# Patient Record
Sex: FEMALE | Race: WHITE
[De-identification: names, ages, dates, MRNs, and addresses within clinical notes are randomized per-mention and may not be internally consistent; named-entity substitution may affect disease eponyms.]

---

## 2021-01-03 ENCOUNTER — HOSPITAL ENCOUNTER (OUTPATIENT)
Dept: HOSPITAL 33 - OB | Age: 21
Setting detail: OBSERVATION
Discharge: HOME | End: 2021-01-03
Attending: OBSTETRICS & GYNECOLOGY | Admitting: OBSTETRICS & GYNECOLOGY
Payer: COMMERCIAL

## 2021-01-03 VITALS — SYSTOLIC BLOOD PRESSURE: 97 MMHG | HEART RATE: 75 BPM | DIASTOLIC BLOOD PRESSURE: 55 MMHG

## 2021-01-03 DIAGNOSIS — O23.43: Primary | ICD-10-CM

## 2021-01-03 DIAGNOSIS — E66.01: ICD-10-CM

## 2021-01-03 DIAGNOSIS — Z3A.36: ICD-10-CM

## 2021-01-03 DIAGNOSIS — O99.213: ICD-10-CM

## 2021-01-03 LAB
AMPHETAMINES UR QL: NEGATIVE
BARBITURATES UR QL: NEGATIVE
BENZODIAZ UR QL SCN: NEGATIVE
COCAINE UR QL SCN: NEGATIVE
GLUCOSE UR-MCNC: NEGATIVE MG/DL
METHADONE UR QL: NEGATIVE
OPIATES UR QL: NEGATIVE
PCP UR QL CFM>20 NG/ML: NEGATIVE
PROT UR STRIP-MCNC: NEGATIVE MG/DL
RBC #/AREA URNS HPF: (no result) /HPF (ref 0–2)
THC UR QL SCN: NEGATIVE
WBC #/AREA URNS HPF: (no result) /HPF (ref 0–5)

## 2021-01-03 PROCEDURE — 81001 URINALYSIS AUTO W/SCOPE: CPT

## 2021-01-03 PROCEDURE — 80307 DRUG TEST PRSMV CHEM ANLYZR: CPT

## 2021-01-11 ENCOUNTER — HOSPITAL ENCOUNTER (OUTPATIENT)
Dept: HOSPITAL 33 - OB | Age: 21
Setting detail: OBSERVATION
LOS: 2 days | Discharge: HOME | End: 2021-01-13
Attending: FAMILY MEDICINE | Admitting: FAMILY MEDICINE
Payer: COMMERCIAL

## 2021-01-11 DIAGNOSIS — F19.11: ICD-10-CM

## 2021-01-11 DIAGNOSIS — D64.9: ICD-10-CM

## 2021-01-11 DIAGNOSIS — Z3A.37: ICD-10-CM

## 2021-01-11 DIAGNOSIS — Z34.83: Primary | ICD-10-CM

## 2021-01-11 LAB
ALBUMIN SERPL-MCNC: 3.3 G/DL (ref 3.5–5)
ALP SERPL-CCNC: 99 U/L (ref 38–126)
ALT SERPL-CCNC: 8 U/L (ref 0–35)
AMPHETAMINES UR QL: NEGATIVE
ANION GAP SERPL CALC-SCNC: 11.4 MEQ/L (ref 5–15)
AST SERPL QL: 12 U/L (ref 14–36)
BARBITURATES UR QL: NEGATIVE
BASOPHILS # BLD AUTO: 0.03 10*3/UL (ref 0–0.4)
BASOPHILS NFR BLD AUTO: 0.2 % (ref 0–0.4)
BENZODIAZ UR QL SCN: NEGATIVE
BILIRUB BLD-MCNC: 0.3 MG/DL (ref 0.2–1.3)
BUN SERPL-MCNC: 8 MG/DL (ref 7–17)
CALCIUM SPEC-MCNC: 9 MG/DL (ref 8.4–10.2)
CHLORIDE SERPL-SCNC: 107 MMOL/L (ref 98–107)
CO2 SERPL-SCNC: 20 MMOL/L (ref 22–30)
COCAINE UR QL SCN: NEGATIVE
CREAT SERPL-MCNC: 0.54 MG/DL (ref 0.52–1.04)
EOSINOPHIL # BLD AUTO: 0.03 10*3/UL (ref 0–0.5)
GFR SERPLBLD BASED ON 1.73 SQ M-ARVRAT: > 60 ML/MIN
GLUCOSE SERPL-MCNC: 99 MG/DL (ref 74–106)
GLUCOSE UR-MCNC: NEGATIVE MG/DL
HCT VFR BLD AUTO: 27.6 % (ref 35–47)
HGB BLD-MCNC: 8.3 GM/DL (ref 12–16)
LYMPHOCYTES # SPEC AUTO: 2.04 10*3/UL (ref 1–4.6)
MCH RBC QN AUTO: 23.5 PG (ref 26–32)
MCHC RBC AUTO-ENTMCNC: 30.1 G/DL (ref 32–36)
METHADONE UR QL: NEGATIVE
MONOCYTES # BLD AUTO: 0.42 10*3/UL (ref 0–1.3)
OPIATES UR QL: NEGATIVE
PCP UR QL CFM>20 NG/ML: NEGATIVE
PLATELET # BLD AUTO: 266 K/MM3 (ref 150–450)
POTASSIUM SERPLBLD-SCNC: 3.5 MMOL/L (ref 3.5–5.1)
PROT SERPL-MCNC: 7 G/DL (ref 6.3–8.2)
PROT UR STRIP-MCNC: NEGATIVE MG/DL
RBC # BLD AUTO: 3.53 M/MM3 (ref 4.1–5.4)
RBC #/AREA URNS HPF: (no result) /HPF (ref 0–2)
SODIUM SERPL-SCNC: 135 MMOL/L (ref 137–145)
THC UR QL SCN: NEGATIVE
WBC # BLD AUTO: 12.2 K/MM3 (ref 4–10.5)
WBC #/AREA URNS HPF: (no result) /HPF (ref 0–5)

## 2021-01-11 PROCEDURE — 36415 COLL VENOUS BLD VENIPUNCTURE: CPT

## 2021-01-11 PROCEDURE — 84112 EVAL AMNIOTIC FLUID PROTEIN: CPT

## 2021-01-11 PROCEDURE — G0378 HOSPITAL OBSERVATION PER HR: HCPCS

## 2021-01-11 PROCEDURE — 85025 COMPLETE CBC W/AUTO DIFF WBC: CPT

## 2021-01-11 PROCEDURE — 76805 OB US >/= 14 WKS SNGL FETUS: CPT

## 2021-01-11 PROCEDURE — 80053 COMPREHEN METABOLIC PANEL: CPT

## 2021-01-11 PROCEDURE — 76815 OB US LIMITED FETUS(S): CPT

## 2021-01-11 PROCEDURE — 81001 URINALYSIS AUTO W/SCOPE: CPT

## 2021-01-11 PROCEDURE — 80307 DRUG TEST PRSMV CHEM ANLYZR: CPT

## 2021-01-11 RX ADMIN — ACETAMINOPHEN PRN MG: 325 TABLET ORAL at 18:59

## 2021-01-12 RX ADMIN — FAMOTIDINE SCH MG: 20 TABLET, FILM COATED ORAL at 09:30

## 2021-01-12 RX ADMIN — ACETAMINOPHEN PRN MG: 325 TABLET ORAL at 22:47

## 2021-01-12 RX ADMIN — FAMOTIDINE SCH MG: 20 TABLET, FILM COATED ORAL at 22:47

## 2021-01-12 NOTE — XRAY
Indication: Fetal well-being.  Poor prenatal care.



Two-dimensional OB ultrasound performed.



Comparison: None



There is a single viable intrauterine pregnancy in cephalic presentation.

Normal four-chamber heart with fetal heart rate 120 BPM.  Normal three-vessel

cord.  Cord insertion, fetal spine, and kidneys not well visualized.

Visualized fetal stomach and bladder are unremarkable.  Anterior fundal

placenta without abruption/previa.



BPD measures 8.79 cm corresponding to 35 weeks 4 days.

HC measures 32.69 cm corresponding to 37 weeks 1 day.

AC measures 33.55 cm corresponding to 37 weeks 3 days.

FL measures 7.16 cm corresponding to 36 weeks 5 days.

Estimated fetal weight 6 lbs. 13 oz., +/-1 lb. 0 oz.  Approximately 41

percentile.

LEEROY is 6.5 cm.



Impression: Single viable intrauterine pregnancy with mean gestational age 36

weeks 5 days.  Expected date confinement is February 4, 2021.

## 2021-01-13 VITALS — HEART RATE: 83 BPM | OXYGEN SATURATION: 98 %

## 2021-01-13 VITALS — DIASTOLIC BLOOD PRESSURE: 65 MMHG | SYSTOLIC BLOOD PRESSURE: 123 MMHG

## 2021-01-13 RX ADMIN — FAMOTIDINE SCH MG: 20 TABLET, FILM COATED ORAL at 10:26

## 2021-01-13 NOTE — XRAY
Indication: Oligohydramnios.



Limited OB ultrasound performed to evaluate LEEROY.  There is a single viable

intrauterine pregnancy with fetal heart rate 130 BPM.  Four-quadrant LEEROY is

7.1 cm.  This was previously 6.5 cm one day earlier.